# Patient Record
Sex: MALE | Race: OTHER | Employment: OTHER
[De-identification: names, ages, dates, MRNs, and addresses within clinical notes are randomized per-mention and may not be internally consistent; named-entity substitution may affect disease eponyms.]

---

## 2017-01-20 ENCOUNTER — PREPPED CHART (OUTPATIENT)
Age: 52
End: 2017-01-20

## 2018-08-13 ASSESSMENT — TONOMETRY
OS_IOP_MMHG: 23
OD_IOP_MMHG: 25

## 2018-08-13 ASSESSMENT — VISUAL ACUITY
OS_CC: J1
OD_CC: J1
OS_CC: 20/20
OD_CC: 20/20-1

## 2018-08-14 ENCOUNTER — ESTABLISHED COMPREHENSIVE EXAM (OUTPATIENT)
Age: 53
End: 2018-08-14

## 2018-08-14 DIAGNOSIS — H04.123: ICD-10-CM

## 2018-08-14 DIAGNOSIS — H40.023: ICD-10-CM

## 2018-08-14 DIAGNOSIS — E11.9: ICD-10-CM

## 2018-08-14 PROCEDURE — 92014 COMPRE OPH EXAM EST PT 1/>: CPT

## 2018-08-14 PROCEDURE — 92015 DETERMINE REFRACTIVE STATE: CPT

## 2018-08-14 PROCEDURE — 92133 CPTRZD OPH DX IMG PST SGM ON: CPT

## 2018-08-14 ASSESSMENT — TONOMETRY
OD_IOP_MMHG: 24
OS_IOP_MMHG: 24

## 2018-08-14 ASSESSMENT — VISUAL ACUITY
OS_CC: 20/20
OD_CC: 20/20
OS_CC: J1
OD_CC: J1

## 2018-09-25 ENCOUNTER — IOP CHECK (OUTPATIENT)
Age: 53
End: 2018-09-25

## 2018-09-25 DIAGNOSIS — H40.023: ICD-10-CM

## 2018-09-25 PROCEDURE — 92083 EXTENDED VISUAL FIELD XM: CPT

## 2018-09-25 PROCEDURE — 99211T TECH SERVICE

## 2018-09-25 ASSESSMENT — VISUAL ACUITY
OS_SC: 20/25-1
OD_SC: 20/30-1

## 2018-09-25 ASSESSMENT — TONOMETRY
OS_IOP_MMHG: 21
OD_IOP_MMHG: 20

## 2019-02-13 ENCOUNTER — IOP CHECK (OUTPATIENT)
Dept: URBAN - METROPOLITAN AREA CLINIC 35 | Facility: CLINIC | Age: 54
End: 2019-02-13

## 2019-02-13 DIAGNOSIS — H40.023: ICD-10-CM

## 2019-02-13 PROCEDURE — 92012 INTRM OPH EXAM EST PATIENT: CPT

## 2019-02-13 PROCEDURE — 76514 ECHO EXAM OF EYE THICKNESS: CPT

## 2019-02-13 ASSESSMENT — VISUAL ACUITY
OD_SC: 20/25
OS_SC: 20/25

## 2019-02-13 ASSESSMENT — TONOMETRY
OD_IOP_MMHG: 20
OS_IOP_MMHG: 20

## 2021-04-01 NOTE — PATIENT DISCUSSION
CATARACT, OU - EQUALLY VISUALLY SIGNIFICANT . SCHEDULE SX OS (DOM EYE) THEN LATER IN OD IF VISUAL SYMPTOMS PERSIST.  GLS RX GIVEN TO FILL IF DESIRES IN THE EVENT PT DOES NOT PROCEED W/ SX.

## 2021-04-06 NOTE — PATIENT DISCUSSION
PATIENT ELECTS TO PROCEED WITH VIVITY WITH WALKER GOAL OF -0.50 OD TO ALLOW MORE NEAR VISION ALTHOUGH PATIENT UNDERSTANDS THAT SHE MAY STILL NEED GLASSES FOR NEAR VISION

## 2021-04-06 NOTE — PATIENT DISCUSSION
***The patient is interested in refractive cataract surgery. After discussing all options for becoming less dependent on glasses after surgery, the patient is considering nanovision with EDOF IOLs. The anticipated visual outcome is satisfactory distance and near with the dominant eye favoring distance (target refraction of plano) and the non-dominant eye favoring near (target refraction of -0.50). ***

## 2021-04-12 NOTE — PATIENT DISCUSSION
REFRACTIVE ERROR- OPTS DISC W/ PT. PT UNDERSTANDS AND ELECTS TO PROCEED W/ REFRACTIVE  CATARACT SURGERY WITH THE CHARMAINE DUE TO INCONSISTENCIES/IRREGULARITES ON HER REPEAT TESTING TODAY. WILL SET OS FOR PLANO AND OD FOR -0.75 TO ALLOW SOME NEAR VISION AND WILL ADJUST AFTER SURGERY WITH LIGHT.

## 2021-04-12 NOTE — PATIENT DISCUSSION
DISCUSSED BENEFIT FROM CHARMAINE DUE TO FLEXIBILITY OF ADJUSTING THE IOL UP TO 3 TIMES AFTER SURGERY. PATIENT UNDERSTANDS THE NEED FOR FULL TIME UV PROTECTIVE GLASSES UNTIL 24 HOURS AFTER THE IOL IS LOCKED.

## 2021-04-12 NOTE — PATIENT DISCUSSION
The RxSight Light Adjustable Lens (RxLAL) is similar to other intraocular lenses (IOLs) that can be implanted in the eye to replace the natural lens that is removed during cataract surgery. While all IOLs improve vision after cataract surgery, most patients will require glasses (or contact lenses) to improve their vision to the level required for driving or reading. The RxLAL reduces the need for glasses or contact lenses by being able to change its focusing power after it is implanted in the eye. The focusing power of the RxLAL is adjusted by specific patterns of ultraviolet (UV) light produced by the RxSight Light Delivery Device (LDD), an instrument that the doctor uses in the office beginning 2-3 weeks after cataract surgery. Up to three light adjustment treatments can be performed to improve the patients vision, with a separation of 3 days between treatments. When the patient and doctor are satisfied with the vision, the same LDD is used to lockin the RxLAL and make the prescription permanent. From immediately after surgery until 24 hours after the completion of the lockin treatment, it was discussed that the need to protect the RxLAL from UV light in the environment by wearing CHARMAINE specific protective eyewear provided during all waking hours.

## 2021-04-15 NOTE — PATIENT DISCUSSION
Surgery  Counseling: I have discussed the option of glasses versus cataract surgery versus following . It was explained that when vision no longer meets the patient's visual needs and a new prescription for glasses is not likely to improve all of the patient's visual symptoms, the option of cataract surgery is a reasonable next step. It was explained that there is no guarantee that removing the cataract will improve their visual symptoms, however; it is believed that the cataract is contributing to the patient's visual impairment and surgery may significantly improve both the visual and functional status of the patient. The risks, benefits and alternatives of surgery were discussed with the patient.   After this discussion, the patient desires to proceed with cataract surgery with implantation of an intraocular lens to improve vision for [address] [place] [address]

## 2021-04-15 NOTE — PATIENT DISCUSSION
***This patient had refractive cataract surgery performed. An IOL was placed to achieve a target refraction of PLANO (which should provide them with satisfactory vision with the possible aid of glasses/contact lenses prn). ***

## 2021-05-10 NOTE — PATIENT DISCUSSION
STABLE REFRACTION TODAY. VISION SLIGHTLY DECREASED OD &gt;OD, LIKELY DUE TO DRY EYE AS NO INFLAMMATION AND MAC OCT CLEAR/NO EDEMA OU. PATIENT ELECTS TO PROCEED WITH FIRST TREAMTENT OU TODAY.

## 2021-05-13 NOTE — PATIENT DISCUSSION
PATIENT ELECTS TO PROCEED WITH SECOND TREATMENT OU TODAY.  PATIENT PREFERS OD GOAL DUE TO BILATERAL DISTANCE VISION BEING BLURRIER WITH MORE MINUS

## 2021-05-17 NOTE — PATIENT DISCUSSION
PATIENT IS HAPPY WITH CURRENT VISION. AFTER DISCUSSION THE PT CHOOSES TO CHANGE HER RIGHT EYE GOAL TO -1.50 BECAUSE SHE IS ABLE TO READ HER IPAD AND HER NEWSPAPER WITHOUT DEPENDENCY ON GLASSES. LEFT EYE IS AT GOAL AND PATIENT IS HAPPY WITH HER DISTANCE VISION. PATIENT ELECTS TO PROCEED WITH LOCK IN OU TODAY.

## 2021-05-24 NOTE — PATIENT DISCUSSION
2ND LOCK IN OS DONE TODAY. ALL TREATMENTS COMPLETED OU - PATIENT ED MUST WEAR UV GLASSES FOR 24 HRS AND THEN CAN DISCARD.

## 2021-05-24 NOTE — PATIENT DISCUSSION
DRY EYE, OU - INCREASE SYSTANE TO 4X/DAY AND SWITCH TO GEL DROP. PATIENT REASSURANCE THAT MRX AT GOAL AND VISION FLUCTUATES LIKELY DUE TO DRY EYE. FOLLOW.

## 2021-10-07 NOTE — PATIENT DISCUSSION
Good postoperative appearance.  Stable. Happy w/ vision. Release to annual care. Upneeq sample dispensed.

## 2022-02-08 NOTE — PATIENT DISCUSSION
Ed. patient. No PCO on exam. Rx Art tears QID OU. Post CHARMAINE; increased astigmatism today OS.  Refer to [facility] for evaluation.

## 2022-02-15 NOTE — PATIENT DISCUSSION
Continue Artificial tears 1 gtt QID OU.  Post CHARMAINE; question astigmatism/residual refractive error.  Follow up in [place] for an  iTrace and OPD (dilated & undilated), OCT mac and Pentacam.

## 2022-03-10 NOTE — PATIENT DISCUSSION
Continue Artificial tears 1 gtt QID OU.  Post CHARMAINE; question astigmatism/residual refractive error.  Follow up in [place] for an  iTrace and OPD (dilated & undilated), OCT mac and Pentacam.

## 2022-03-10 NOTE — PATIENT DISCUSSION
Visual complaints are mostly due to decreased near vision in the right eye and possibly some residual astigmatism in the left eye. Placing a trial lens of +0.75 over the right eye in office greatly improved her near vision symptoms. PLAN: refer to Dr. Learta Boast for a contact lens trial to improve the near vision in the right eye and reduce astigmatism in the left to narrow down if the patient may need a touch up in the right eye or both eyes. The patient feels her distance vision is okay but she also feels more aware of her left eye than she use to which may be due to some of the residual astigmatism found on mapping and refraction.

## 2022-04-22 NOTE — PATIENT DISCUSSION
Continue Artificial tears 1 gtt QID OU.  Post CHARMAINE; question astigmatism/residual refractive error.  Follow up in [place] for an  iTrace and OPD (dilated & undilated), OCT mac and Pentacam.

## 2022-04-22 NOTE — PATIENT DISCUSSION
Given what we had in office, patient has acceptable distance and near vision when leaving office today (per patient and physician). Questioning if Near CLRx OD will detract from DV in OS. Patient agrees to trial lenses for 1 week to test real-life conditions. Artificial tear sample given to keep eyes comfortable over the next week. f/u with [facility].

## 2022-05-13 NOTE — PATIENT DISCUSSION
Continue Artificial tears 1 gtt QID OU.  Post CHARMAINE; question astigmatism/residual refractive error.  Follow up in [place] for an  iTrace and OPD (dilated & undilated), OCT mac and Pentacam.

## 2022-05-16 NOTE — PATIENT DISCUSSION
Continue Artificial tears 1 gtt QID OU.  Post CHARMAINE; question astigmatism/residual refractive error.  Follow up in [place] for an  iTrace and OPD (dilated & undilated), OCT mac and Pentacam.

## 2022-05-16 NOTE — PATIENT DISCUSSION
Patient happy with vision s/p CLRx trial OU. Proceed with LASIK OU- plan off of CLRx trial with OD being near eye. [facility] to advise if needed.

## 2022-05-19 NOTE — PATIENT DISCUSSION
Patient presented for Lasik today, in depth review of previous VA's and MRX post lock in. Recommend not treating due to inconsistent measurements, unsure if moderate amount of astigmatism that is in her MRX and iTrace is true. Mild PCO noted, will proceed with YAG laser to allow more light into the eye and may help with vision.

## 2022-05-19 NOTE — PATIENT DISCUSSION
Continue Artificial tears 1 gtt QID OU.  Post CHARMAINE; question astigmatism/residual refractive error.  Follow up in [place] for an  iTrace and OPD (dilated & undilated), OCT mac and Pentacam.

## 2022-05-19 NOTE — PATIENT DISCUSSION
Posterior capsular opacity accounts for the patient's complaints and is interfering with activities of daily living. Discussed risks and benefits of YAG Laser Capsulotomy. Patient wishes to proceed. Schedule YAG Laser Capsulotomy in the OS eye.

## 2022-07-01 NOTE — PATIENT DISCUSSION
Continue Artificial tears 1 gtt QID OU.  Post CHARMAINE; question astigmatism/residual refractive error.  Follow up in [place] for an  iTrace and OPD (dilated & undilated), OCT mac and Pentacam.

## 2023-06-23 ENCOUNTER — NEW PATIENT (OUTPATIENT)
Dept: URBAN - METROPOLITAN AREA CLINIC 35 | Facility: CLINIC | Age: 58
End: 2023-06-23

## 2023-06-23 DIAGNOSIS — H52.02: ICD-10-CM

## 2023-06-23 DIAGNOSIS — H52.4: ICD-10-CM

## 2023-06-23 DIAGNOSIS — E11.9: ICD-10-CM

## 2023-06-23 DIAGNOSIS — H40.1131: ICD-10-CM

## 2023-06-23 DIAGNOSIS — H04.123: ICD-10-CM

## 2023-06-23 DIAGNOSIS — H52.203: ICD-10-CM

## 2023-06-23 PROCEDURE — 92133 CPTRZD OPH DX IMG PST SGM ON: CPT

## 2023-06-23 PROCEDURE — 92015 DETERMINE REFRACTIVE STATE: CPT

## 2023-06-23 PROCEDURE — 99204 OFFICE O/P NEW MOD 45 MIN: CPT

## 2023-06-23 ASSESSMENT — VISUAL ACUITY
OU_CC: J1
OD_CC: J1
OS_SC: J10
OU_SC: J8
OS_SC: 20/30
OS_CC: 20/25+2
OD_SC: 20/25+1
OD_SC: J10-
OD_CC: 20/20
OS_CC: J1
OU_SC: 20/25-2
OU_CC: 20/20

## 2023-06-23 ASSESSMENT — TONOMETRY
OS_IOP_MMHG: 23,24
OD_IOP_MMHG: 23,24

## 2023-08-07 ENCOUNTER — CONSULTATION/EVALUATION (OUTPATIENT)
Dept: URBAN - METROPOLITAN AREA CLINIC 39 | Facility: CLINIC | Age: 58
End: 2023-08-07

## 2023-08-07 DIAGNOSIS — H04.123: ICD-10-CM

## 2023-08-07 DIAGNOSIS — E11.9: ICD-10-CM

## 2023-08-07 DIAGNOSIS — H40.1131: ICD-10-CM

## 2023-08-07 PROCEDURE — 92250 FUNDUS PHOTOGRAPHY W/I&R: CPT

## 2023-08-07 PROCEDURE — 92004 COMPRE OPH EXAM NEW PT 1/>: CPT

## 2023-08-07 ASSESSMENT — TONOMETRY
OS_IOP_MMHG: 21
OD_IOP_MMHG: 23

## 2023-08-07 ASSESSMENT — VISUAL ACUITY
OD_SC: J10-
OD_SC: 20/20-1
OS_SC: 20/25-2
OS_SC: J10

## 2023-08-15 ENCOUNTER — CLINIC PROCEDURE ONLY (OUTPATIENT)
Dept: URBAN - METROPOLITAN AREA CLINIC 39 | Facility: CLINIC | Age: 58
End: 2023-08-15

## 2023-08-15 DIAGNOSIS — H40.1131: ICD-10-CM

## 2023-08-15 PROCEDURE — 65855 TRABECULOPLASTY LASER SURG: CPT

## 2023-08-15 ASSESSMENT — TONOMETRY
OS_IOP_MMHG: 20
OD_IOP_MMHG: 20

## 2023-08-15 ASSESSMENT — VISUAL ACUITY
OS_SC: 20/20-1
OD_SC: 20/20-1

## 2023-09-27 ENCOUNTER — FOLLOW UP (OUTPATIENT)
Dept: URBAN - METROPOLITAN AREA CLINIC 35 | Facility: CLINIC | Age: 58
End: 2023-09-27

## 2023-09-27 DIAGNOSIS — H40.1131: ICD-10-CM

## 2023-09-27 DIAGNOSIS — Z98.890: ICD-10-CM

## 2023-09-27 PROCEDURE — 99213 OFFICE O/P EST LOW 20 MIN: CPT

## 2023-09-27 ASSESSMENT — TONOMETRY
OD_IOP_MMHG: 15
OS_IOP_MMHG: 17

## 2023-09-27 ASSESSMENT — VISUAL ACUITY
OU_SC: 20/20-1
OS_SC: 20/25-2
OD_SC: 20/25